# Patient Record
Sex: MALE | Race: WHITE | Employment: FULL TIME | URBAN - METROPOLITAN AREA
[De-identification: names, ages, dates, MRNs, and addresses within clinical notes are randomized per-mention and may not be internally consistent; named-entity substitution may affect disease eponyms.]

---

## 2017-08-01 ENCOUNTER — HOSPITAL ENCOUNTER (OUTPATIENT)
Dept: GENERAL RADIOLOGY | Age: 42
Discharge: HOME | End: 2017-08-01
Attending: PHYSICIAN ASSISTANT
Payer: COMMERCIAL

## 2017-08-01 ENCOUNTER — OFFICE VISIT (OUTPATIENT)
Dept: FAMILY MEDICINE CLINIC | Facility: CLINIC | Age: 42
End: 2017-08-01

## 2017-08-01 VITALS
SYSTOLIC BLOOD PRESSURE: 116 MMHG | BODY MASS INDEX: 27.57 KG/M2 | HEIGHT: 73 IN | OXYGEN SATURATION: 97 % | WEIGHT: 208 LBS | HEART RATE: 64 BPM | DIASTOLIC BLOOD PRESSURE: 84 MMHG | TEMPERATURE: 99 F | RESPIRATION RATE: 16 BRPM

## 2017-08-01 DIAGNOSIS — Z20.818 EXPOSURE TO STREP THROAT: ICD-10-CM

## 2017-08-01 DIAGNOSIS — S99.922A TOE INJURY, LEFT, INITIAL ENCOUNTER: ICD-10-CM

## 2017-08-01 DIAGNOSIS — S99.922A TOE INJURY, LEFT, INITIAL ENCOUNTER: Primary | ICD-10-CM

## 2017-08-01 LAB
CONTROL LINE PRESENT WITH A CLEAR BACKGROUND (YES/NO): YES YES/NO
STREP GRP A CUL-SCR: NEGATIVE

## 2017-08-01 PROCEDURE — 87880 STREP A ASSAY W/OPTIC: CPT | Performed by: PHYSICIAN ASSISTANT

## 2017-08-01 PROCEDURE — 73630 X-RAY EXAM OF FOOT: CPT | Performed by: PHYSICIAN ASSISTANT

## 2017-08-01 PROCEDURE — 99204 OFFICE O/P NEW MOD 45 MIN: CPT | Performed by: PHYSICIAN ASSISTANT

## 2017-08-01 RX ORDER — VIT A/VIT C/VIT E/ZINC/COPPER 7160-113
TABLET, DELAYED RELEASE (ENTERIC COATED) ORAL
COMMUNITY
End: 2021-04-19

## 2017-08-01 NOTE — PROGRESS NOTES
CHIEF COMPLAINT:   Patient presents with: Toe Injury: LT foot, second toe injury.      HPI:   Charlene Rajan is a 40year old male who presents for possible strep throat- was exposed- kids were diagnosed this week and last and left second toe injury/ Right arm, Patient Position: Sitting, Cuff Size: adult)   Pulse 64   Temp 98.8 °F (37.1 °C) (Oral)   Resp 16   Ht 73\"   Wt 208 lb   SpO2 97%   BMI 27.44 kg/m²   GENERAL: well developed, well nourished, and in no apparent distress  SKIN: no rashes, no susp stabilized- I advised this would most likely be a boot, pt states he would rather stabilize at home as wife is ER nurse. Patient advised to f/u with PCP or ortho for persistent symptoms or acute worsening/new symptoms.   In meantime, elevate, rest, and mot

## 2018-01-16 PROBLEM — Z12.11 COLON CANCER SCREENING: Status: ACTIVE | Noted: 2018-01-16

## 2018-01-16 PROCEDURE — 80074 ACUTE HEPATITIS PANEL: CPT | Performed by: INTERNAL MEDICINE

## 2018-12-08 PROCEDURE — 82390 ASSAY OF CERULOPLASMIN: CPT | Performed by: INTERNAL MEDICINE

## 2018-12-08 PROCEDURE — 80074 ACUTE HEPATITIS PANEL: CPT | Performed by: INTERNAL MEDICINE

## 2018-12-08 PROCEDURE — 83516 IMMUNOASSAY NONANTIBODY: CPT | Performed by: INTERNAL MEDICINE

## 2018-12-08 PROCEDURE — 82103 ALPHA-1-ANTITRYPSIN TOTAL: CPT | Performed by: INTERNAL MEDICINE

## 2018-12-19 PROBLEM — R03.0 PREHYPERTENSION: Status: ACTIVE | Noted: 2018-12-19

## 2019-02-15 PROBLEM — Z83.71 FAMILY HISTORY OF COLONIC POLYPS: Status: ACTIVE | Noted: 2019-02-15

## 2019-02-15 PROBLEM — Z80.0 FAMILY HISTORY OF MALIGNANT NEOPLASM OF DIGESTIVE ORGAN: Status: ACTIVE | Noted: 2019-02-15

## 2019-02-15 PROBLEM — R13.10 DYSPHAGIA: Status: ACTIVE | Noted: 2019-02-15

## 2019-02-15 PROBLEM — K21.9 GASTROESOPHAGEAL REFLUX DISEASE WITHOUT ESOPHAGITIS: Status: ACTIVE | Noted: 2019-02-15

## 2019-02-15 PROBLEM — K64.1 SECOND DEGREE HEMORRHOIDS: Status: ACTIVE | Noted: 2019-02-15

## 2019-02-15 PROBLEM — Z12.11 SPECIAL SCREENING FOR MALIGNANT NEOPLASM OF COLON: Status: ACTIVE | Noted: 2019-02-15

## 2019-02-15 PROBLEM — Z83.719 FAMILY HISTORY OF COLONIC POLYPS: Status: ACTIVE | Noted: 2019-02-15

## 2019-02-15 PROBLEM — K21.00 REFLUX ESOPHAGITIS: Status: ACTIVE | Noted: 2019-02-15

## 2019-11-20 PROCEDURE — 88305 TISSUE EXAM BY PATHOLOGIST: CPT | Performed by: ORTHOPAEDIC SURGERY

## 2019-11-25 PROBLEM — Z47.89 ORTHOPEDIC AFTERCARE: Status: ACTIVE | Noted: 2019-11-25

## 2021-04-19 ENCOUNTER — OFFICE VISIT (OUTPATIENT)
Dept: INTERNAL MEDICINE CLINIC | Facility: CLINIC | Age: 46
End: 2021-04-19
Payer: COMMERCIAL

## 2021-04-19 VITALS
HEART RATE: 72 BPM | RESPIRATION RATE: 16 BRPM | DIASTOLIC BLOOD PRESSURE: 72 MMHG | SYSTOLIC BLOOD PRESSURE: 124 MMHG | BODY MASS INDEX: 27.43 KG/M2 | HEIGHT: 73 IN | WEIGHT: 207 LBS | TEMPERATURE: 97 F | OXYGEN SATURATION: 98 %

## 2021-04-19 DIAGNOSIS — Z13.29 THYROID DISORDER SCREEN: ICD-10-CM

## 2021-04-19 DIAGNOSIS — Z13.89 SCREENING FOR GENITOURINARY CONDITION: ICD-10-CM

## 2021-04-19 DIAGNOSIS — Z00.00 LABORATORY EXAMINATION ORDERED AS PART OF A ROUTINE GENERAL MEDICAL EXAMINATION: ICD-10-CM

## 2021-04-19 DIAGNOSIS — Z00.00 ANNUAL PHYSICAL EXAM: Primary | ICD-10-CM

## 2021-04-19 DIAGNOSIS — Z13.0 SCREENING, IRON DEFICIENCY ANEMIA: ICD-10-CM

## 2021-04-19 DIAGNOSIS — K21.9 GASTROESOPHAGEAL REFLUX DISEASE: ICD-10-CM

## 2021-04-19 DIAGNOSIS — Z13.220 LIPID SCREENING: ICD-10-CM

## 2021-04-19 PROBLEM — R03.0 PREHYPERTENSION: Status: RESOLVED | Noted: 2018-12-19 | Resolved: 2021-04-19

## 2021-04-19 PROBLEM — Z47.89 ORTHOPEDIC AFTERCARE: Status: RESOLVED | Noted: 2019-11-25 | Resolved: 2021-04-19

## 2021-04-19 PROBLEM — R13.10 DYSPHAGIA: Status: RESOLVED | Noted: 2019-02-15 | Resolved: 2021-04-19

## 2021-04-19 PROBLEM — Z12.11 COLON CANCER SCREENING: Status: RESOLVED | Noted: 2018-01-16 | Resolved: 2021-04-19

## 2021-04-19 PROBLEM — K21.00 REFLUX ESOPHAGITIS: Status: RESOLVED | Noted: 2019-02-15 | Resolved: 2021-04-19

## 2021-04-19 PROBLEM — K64.1 SECOND DEGREE HEMORRHOIDS: Status: RESOLVED | Noted: 2019-02-15 | Resolved: 2021-04-19

## 2021-04-19 PROBLEM — Z12.11 SPECIAL SCREENING FOR MALIGNANT NEOPLASM OF COLON: Status: RESOLVED | Noted: 2019-02-15 | Resolved: 2021-04-19

## 2021-04-19 PROBLEM — Z83.71 FAMILY HISTORY OF COLONIC POLYPS: Status: RESOLVED | Noted: 2019-02-15 | Resolved: 2021-04-19

## 2021-04-19 PROBLEM — Z83.719 FAMILY HISTORY OF COLONIC POLYPS: Status: RESOLVED | Noted: 2019-02-15 | Resolved: 2021-04-19

## 2021-04-19 PROCEDURE — 99386 PREV VISIT NEW AGE 40-64: CPT | Performed by: INTERNAL MEDICINE

## 2021-04-19 PROCEDURE — 3008F BODY MASS INDEX DOCD: CPT | Performed by: INTERNAL MEDICINE

## 2021-04-19 PROCEDURE — 3074F SYST BP LT 130 MM HG: CPT | Performed by: INTERNAL MEDICINE

## 2021-04-19 PROCEDURE — 3078F DIAST BP <80 MM HG: CPT | Performed by: INTERNAL MEDICINE

## 2021-04-19 RX ORDER — OMEPRAZOLE 40 MG/1
40 CAPSULE, DELAYED RELEASE ORAL DAILY
Qty: 90 CAPSULE | Refills: 3 | Status: SHIPPED | OUTPATIENT
Start: 2021-04-19 | End: 2021-12-14

## 2021-04-19 NOTE — PROGRESS NOTES
HPI/Subjective:   Patient ID: Yoon Gotti is a 40year old male.     HPI  Yoon Gotti is a 40year old male who presents for a complete physical exam.   HPI:   Pt complains of nothing  gerd sxs controlled, PPI controlled    PAST MEDICAL, S Wears glasses       Past Surgical History:   Procedure Laterality Date   • COLONOSCOPY     • EGD     • TONSILLECTOMY        Family History   Problem Relation Age of Onset   • Colon Polyps Father    • Lipids Father    • Colon Polyps Paternal Uncle       Soc clubbing or edema  NEURO: Oriented times three,motor and sensory are grossly intact    ASSESSMENT AND PLAN:   Bret Copeland is a 40year old male who presents for a complete physical exam.  Body mass index is 27.31 kg/m². , recommended low fat diet a

## 2021-09-10 LAB
AMB EXT CHOL/HDL RATIO: 4.4
AMB EXT CHOLESTEROL, TOTAL: 283 MG/DL
AMB EXT CMP ALT: 67 U/L
AMB EXT CMP AST: 45 U/L
AMB EXT GLUCOSE: 106 MG/DL
AMB EXT HDL CHOLESTEROL: 65 MG/DL
AMB EXT LDL CHOLESTEROL, DIRECT: 180 MG/DL
AMB EXT TRIGLYCERIDES: 189 MG/DL
AMB EXT VLDL: 38 MG/DL

## 2021-09-13 ENCOUNTER — MED REC SCAN ONLY (OUTPATIENT)
Dept: INTERNAL MEDICINE CLINIC | Facility: CLINIC | Age: 46
End: 2021-09-13

## 2021-09-14 ENCOUNTER — PATIENT MESSAGE (OUTPATIENT)
Dept: INTERNAL MEDICINE CLINIC | Facility: CLINIC | Age: 46
End: 2021-09-14

## 2021-09-17 ENCOUNTER — TELEPHONE (OUTPATIENT)
Dept: INTERNAL MEDICINE CLINIC | Facility: CLINIC | Age: 46
End: 2021-09-17

## 2021-09-17 DIAGNOSIS — R79.89 ELEVATED LFTS: ICD-10-CM

## 2021-09-17 DIAGNOSIS — E78.2 MODERATE MIXED HYPERLIPIDEMIA NOT REQUIRING STATIN THERAPY: Primary | ICD-10-CM

## 2021-09-17 PROBLEM — Z91.89 10 YEAR RISK OF MI OR STROKE < 7.5%: Status: ACTIVE | Noted: 2021-09-17

## 2021-09-17 NOTE — TELEPHONE ENCOUNTER
Dr. Joanna Pelayo reviewed labs from [address] [place] [address]. See scanned results. Per Dr. Joanna Pelayo begin Low fat/cholesterol for elevated lipids, recheck lipid and LFT in 6 months. D/w pt above recommendations. He expressed understanding.   Orders placed

## 2021-11-10 ENCOUNTER — MED REC SCAN ONLY (OUTPATIENT)
Dept: INTERNAL MEDICINE CLINIC | Facility: CLINIC | Age: 46
End: 2021-11-10

## 2021-12-14 ENCOUNTER — OFFICE VISIT (OUTPATIENT)
Dept: INTERNAL MEDICINE CLINIC | Facility: CLINIC | Age: 46
End: 2021-12-14
Payer: COMMERCIAL

## 2021-12-14 VITALS
SYSTOLIC BLOOD PRESSURE: 124 MMHG | HEART RATE: 85 BPM | OXYGEN SATURATION: 99 % | DIASTOLIC BLOOD PRESSURE: 70 MMHG | WEIGHT: 221 LBS | RESPIRATION RATE: 16 BRPM | BODY MASS INDEX: 29.29 KG/M2 | TEMPERATURE: 98 F | HEIGHT: 73 IN

## 2021-12-14 DIAGNOSIS — F51.04 PSYCHOPHYSIOLOGIC INSOMNIA: ICD-10-CM

## 2021-12-14 DIAGNOSIS — S29.012A STRAIN OF RHOMBOID MUSCLE, INITIAL ENCOUNTER: Primary | ICD-10-CM

## 2021-12-14 DIAGNOSIS — K21.9 GASTROESOPHAGEAL REFLUX DISEASE: ICD-10-CM

## 2021-12-14 PROCEDURE — 3074F SYST BP LT 130 MM HG: CPT | Performed by: INTERNAL MEDICINE

## 2021-12-14 PROCEDURE — 3008F BODY MASS INDEX DOCD: CPT | Performed by: INTERNAL MEDICINE

## 2021-12-14 PROCEDURE — 3078F DIAST BP <80 MM HG: CPT | Performed by: INTERNAL MEDICINE

## 2021-12-14 PROCEDURE — 99213 OFFICE O/P EST LOW 20 MIN: CPT | Performed by: INTERNAL MEDICINE

## 2021-12-14 RX ORDER — HYDROCORTISONE 25 MG/G
1 CREAM TOPICAL 2 TIMES DAILY
Qty: 30 G | Refills: 2 | Status: SHIPPED | OUTPATIENT
Start: 2021-12-14 | End: 2021-12-14

## 2021-12-14 RX ORDER — TIZANIDINE HYDROCHLORIDE 4 MG/1
4 CAPSULE, GELATIN COATED ORAL NIGHTLY
Qty: 5 CAPSULE | Refills: 0 | Status: SHIPPED | OUTPATIENT
Start: 2021-12-14

## 2021-12-14 RX ORDER — TRAZODONE HYDROCHLORIDE 50 MG/1
50 TABLET ORAL NIGHTLY PRN
Qty: 90 TABLET | Refills: 1 | Status: SHIPPED | OUTPATIENT
Start: 2021-12-14

## 2021-12-14 RX ORDER — OMEPRAZOLE 40 MG/1
40 CAPSULE, DELAYED RELEASE ORAL DAILY
Qty: 90 CAPSULE | Refills: 3 | Status: SHIPPED | OUTPATIENT
Start: 2021-12-14

## 2021-12-15 NOTE — PROGRESS NOTES
Subjective:   Patient ID: Nannette Urbina is a 40year old male. Neck Pain   This is a new problem. The current episode started more than 1 month ago. The problem occurs constantly. The problem has been waxing and waning.  The pain is associated wit Alert and oriented x 3. HEENT: Normocephalic atraumatic. Moist mucous membranes. EOM-I. PERRLA. Anicteric. Neck: No lymphadenopathy. tender right sternocleido muscles  Respiratory: Clear to auscultation bilaterally. No wheezes. No rhonchi.   Davey Fabry

## 2023-03-17 DIAGNOSIS — K21.9 GASTROESOPHAGEAL REFLUX DISEASE: ICD-10-CM

## 2023-03-17 RX ORDER — OMEPRAZOLE 40 MG/1
CAPSULE, DELAYED RELEASE ORAL
Qty: 90 CAPSULE | Refills: 3 | Status: SHIPPED | OUTPATIENT
Start: 2023-03-17

## 2023-03-17 NOTE — TELEPHONE ENCOUNTER
Last time medication was refilled 12/14/21  Quantity and # of refills: 90 caps w/3 refills  Last OV 12/14/21  Next OV No future appointments.

## 2023-07-25 ENCOUNTER — APPOINTMENT (OUTPATIENT)
Dept: GENERAL RADIOLOGY | Age: 48
End: 2023-07-25
Attending: EMERGENCY MEDICINE
Payer: COMMERCIAL

## 2023-07-25 ENCOUNTER — HOSPITAL ENCOUNTER (EMERGENCY)
Age: 48
Discharge: HOME | End: 2023-07-26
Attending: EMERGENCY MEDICINE
Payer: COMMERCIAL

## 2023-07-25 DIAGNOSIS — K21.00 GASTROESOPHAGEAL REFLUX DISEASE WITH ESOPHAGITIS WITHOUT HEMORRHAGE: Primary | ICD-10-CM

## 2023-07-25 LAB
ALBUMIN SERPL-MCNC: 3.8 G/DL (ref 3.4–5)
ALBUMIN/GLOB SERPL: 1.2 {RATIO} (ref 1–2)
ALP LIVER SERPL-CCNC: 94 U/L
ALT SERPL-CCNC: 99 U/L
ANION GAP SERPL CALC-SCNC: 4 MMOL/L (ref 0–18)
AST SERPL-CCNC: 48 U/L (ref 15–37)
BASOPHILS # BLD AUTO: 0.05 X10(3) UL (ref 0–0.2)
BASOPHILS NFR BLD AUTO: 0.6 %
BILIRUB SERPL-MCNC: 0.7 MG/DL (ref 0.1–2)
BUN BLD-MCNC: 12 MG/DL (ref 7–18)
CALCIUM BLD-MCNC: 8.4 MG/DL (ref 8.5–10.1)
CHLORIDE SERPL-SCNC: 107 MMOL/L (ref 98–112)
CO2 SERPL-SCNC: 25 MMOL/L (ref 21–32)
CREAT BLD-MCNC: 1.18 MG/DL
EGFRCR SERPLBLD CKD-EPI 2021: 77 ML/MIN/1.73M2 (ref 60–?)
EOSINOPHIL # BLD AUTO: 0.46 X10(3) UL (ref 0–0.7)
EOSINOPHIL NFR BLD AUTO: 5.9 %
ERYTHROCYTE [DISTWIDTH] IN BLOOD BY AUTOMATED COUNT: 12.5 %
GLOBULIN PLAS-MCNC: 3.1 G/DL (ref 2.8–4.4)
GLUCOSE BLD-MCNC: 97 MG/DL (ref 70–99)
HCT VFR BLD AUTO: 47.1 %
HGB BLD-MCNC: 16.4 G/DL
IMM GRANULOCYTES # BLD AUTO: 0.01 X10(3) UL (ref 0–1)
IMM GRANULOCYTES NFR BLD: 0.1 %
LYMPHOCYTES # BLD AUTO: 2.05 X10(3) UL (ref 1–4)
LYMPHOCYTES NFR BLD AUTO: 26.1 %
MCH RBC QN AUTO: 30 PG (ref 26–34)
MCHC RBC AUTO-ENTMCNC: 34.8 G/DL (ref 31–37)
MCV RBC AUTO: 86.3 FL
MONOCYTES # BLD AUTO: 0.7 X10(3) UL (ref 0.1–1)
MONOCYTES NFR BLD AUTO: 8.9 %
NEUTROPHILS # BLD AUTO: 4.58 X10 (3) UL (ref 1.5–7.7)
NEUTROPHILS # BLD AUTO: 4.58 X10(3) UL (ref 1.5–7.7)
NEUTROPHILS NFR BLD AUTO: 58.4 %
OSMOLALITY SERPL CALC.SUM OF ELEC: 282 MOSM/KG (ref 275–295)
PLATELET # BLD AUTO: 203 10(3)UL (ref 150–450)
POTASSIUM SERPL-SCNC: 3.9 MMOL/L (ref 3.5–5.1)
PROT SERPL-MCNC: 6.9 G/DL (ref 6.4–8.2)
RBC # BLD AUTO: 5.46 X10(6)UL
SODIUM SERPL-SCNC: 136 MMOL/L (ref 136–145)
TROPONIN I HIGH SENSITIVITY: 3 NG/L
WBC # BLD AUTO: 7.9 X10(3) UL (ref 4–11)

## 2023-07-25 PROCEDURE — 96374 THER/PROPH/DIAG INJ IV PUSH: CPT

## 2023-07-25 PROCEDURE — 84484 ASSAY OF TROPONIN QUANT: CPT

## 2023-07-25 PROCEDURE — 99284 EMERGENCY DEPT VISIT MOD MDM: CPT

## 2023-07-25 PROCEDURE — 93010 ELECTROCARDIOGRAM REPORT: CPT

## 2023-07-25 PROCEDURE — 93005 ELECTROCARDIOGRAM TRACING: CPT

## 2023-07-25 PROCEDURE — 80053 COMPREHEN METABOLIC PANEL: CPT

## 2023-07-25 PROCEDURE — 85025 COMPLETE CBC W/AUTO DIFF WBC: CPT | Performed by: EMERGENCY MEDICINE

## 2023-07-25 PROCEDURE — 84484 ASSAY OF TROPONIN QUANT: CPT | Performed by: EMERGENCY MEDICINE

## 2023-07-25 PROCEDURE — 80053 COMPREHEN METABOLIC PANEL: CPT | Performed by: EMERGENCY MEDICINE

## 2023-07-25 PROCEDURE — 71045 X-RAY EXAM CHEST 1 VIEW: CPT | Performed by: EMERGENCY MEDICINE

## 2023-07-25 PROCEDURE — S0028 INJECTION, FAMOTIDINE, 20 MG: HCPCS | Performed by: EMERGENCY MEDICINE

## 2023-07-25 PROCEDURE — 99285 EMERGENCY DEPT VISIT HI MDM: CPT

## 2023-07-25 PROCEDURE — 85025 COMPLETE CBC W/AUTO DIFF WBC: CPT

## 2023-07-25 RX ORDER — MAGNESIUM HYDROXIDE/ALUMINUM HYDROXICE/SIMETHICONE 120; 1200; 1200 MG/30ML; MG/30ML; MG/30ML
30 SUSPENSION ORAL ONCE
Status: COMPLETED | OUTPATIENT
Start: 2023-07-25 | End: 2023-07-25

## 2023-07-25 RX ORDER — FAMOTIDINE 10 MG/ML
20 INJECTION, SOLUTION INTRAVENOUS ONCE
Status: COMPLETED | OUTPATIENT
Start: 2023-07-25 | End: 2023-07-25

## 2023-07-25 RX ORDER — ASPIRIN 81 MG/1
324 TABLET, CHEWABLE ORAL ONCE
Status: COMPLETED | OUTPATIENT
Start: 2023-07-25 | End: 2023-07-25

## 2023-07-26 VITALS
TEMPERATURE: 98 F | HEART RATE: 65 BPM | HEIGHT: 73 IN | OXYGEN SATURATION: 96 % | DIASTOLIC BLOOD PRESSURE: 88 MMHG | RESPIRATION RATE: 16 BRPM | BODY MASS INDEX: 27.83 KG/M2 | SYSTOLIC BLOOD PRESSURE: 122 MMHG | WEIGHT: 210 LBS

## 2023-07-26 LAB
ATRIAL RATE: 67 BPM
P AXIS: 30 DEGREES
P-R INTERVAL: 154 MS
Q-T INTERVAL: 404 MS
QRS DURATION: 76 MS
QTC CALCULATION (BEZET): 426 MS
R AXIS: 19 DEGREES
T AXIS: 33 DEGREES
VENTRICULAR RATE: 67 BPM

## 2023-07-26 NOTE — ED INITIAL ASSESSMENT (HPI)
Pt to ed with c/o intermittent chest pain that started 40 min ago while he was eating dinner. States pain radiates to his L chest and feels like a \"twisting\" sensation. States he threw up once when the pain first came. Denies shortness of breath.

## 2024-06-25 ENCOUNTER — OFFICE VISIT (OUTPATIENT)
Dept: INTERNAL MEDICINE CLINIC | Facility: CLINIC | Age: 49
End: 2024-06-25

## 2024-06-25 VITALS
OXYGEN SATURATION: 98 % | BODY MASS INDEX: 28.76 KG/M2 | WEIGHT: 217 LBS | RESPIRATION RATE: 16 BRPM | TEMPERATURE: 97 F | DIASTOLIC BLOOD PRESSURE: 82 MMHG | HEART RATE: 78 BPM | SYSTOLIC BLOOD PRESSURE: 124 MMHG | HEIGHT: 73 IN

## 2024-06-25 DIAGNOSIS — K21.9 GASTROESOPHAGEAL REFLUX DISEASE: ICD-10-CM

## 2024-06-25 DIAGNOSIS — Z00.00 ROUTINE GENERAL MEDICAL EXAMINATION AT A HEALTH CARE FACILITY: ICD-10-CM

## 2024-06-25 DIAGNOSIS — N52.9 ERECTILE DYSFUNCTION, UNSPECIFIED ERECTILE DYSFUNCTION TYPE: ICD-10-CM

## 2024-06-25 DIAGNOSIS — Z00.00 ANNUAL PHYSICAL EXAM: Primary | ICD-10-CM

## 2024-06-25 DIAGNOSIS — L98.9 SKIN LESION: ICD-10-CM

## 2024-06-25 PROCEDURE — 3008F BODY MASS INDEX DOCD: CPT | Performed by: INTERNAL MEDICINE

## 2024-06-25 PROCEDURE — 99396 PREV VISIT EST AGE 40-64: CPT | Performed by: INTERNAL MEDICINE

## 2024-06-25 PROCEDURE — 3074F SYST BP LT 130 MM HG: CPT | Performed by: INTERNAL MEDICINE

## 2024-06-25 PROCEDURE — 3079F DIAST BP 80-89 MM HG: CPT | Performed by: INTERNAL MEDICINE

## 2024-06-25 RX ORDER — OMEPRAZOLE 40 MG/1
40 CAPSULE, DELAYED RELEASE ORAL DAILY
Qty: 90 CAPSULE | Refills: 3 | Status: SHIPPED | OUTPATIENT
Start: 2024-06-25

## 2024-06-25 RX ORDER — SILDENAFIL 100 MG/1
TABLET, FILM COATED ORAL
Qty: 24 TABLET | Refills: 3 | Status: SHIPPED | OUTPATIENT
Start: 2024-06-25

## 2024-06-25 NOTE — PROGRESS NOTES
Subjective:   Patient ID: Jeramy Tarango is a 49 year old male.    HPI  Jeramy Tarango is a 49 year old male who presents for a complete physical exam.   HPI:   Pt complains of nothing  Normal state of health  Gerd stable  Would like vaigra for ED    PAST MEDICAL, SOCIAL, FAMILY HISTORIES REVIEWED WITH PT  .    Immunization History   Administered Date(s) Administered    Covid-19 Vaccine Pfizer 30 mcg/0.3 ml 03/04/2021, 03/25/2021, 12/08/2021    Influenza 10/03/2018, 11/10/2021    TDAP 04/19/2012, 03/17/2022     Wt Readings from Last 6 Encounters:   06/25/24 217 lb (98.4 kg)   07/25/23 210 lb (95.3 kg)   12/14/21 221 lb (100.2 kg)   04/19/21 207 lb (93.9 kg)   11/20/19 191 lb (86.6 kg)   11/07/19 195 lb (88.5 kg)     Body mass index is 28.63 kg/m².     Lab Results   Component Value Date    GLU 97 07/25/2023     09/10/2021    GLU 93 01/15/2018    GLUCOSE 89 09/27/2016    GLUCOSE 85 09/26/2015    GLUCOSE 86 04/07/2014     Lab Results   Component Value Date    CHOLEST 283 09/10/2021    CHOLEST 192 12/08/2018    CHOLEST 251 (H) 01/15/2018     Lab Results   Component Value Date    HDL 65 09/10/2021    HDL 53 12/08/2018    HDL 61 01/15/2018     Lab Results   Component Value Date     12/08/2018     (H) 01/15/2018     (H) 09/27/2016     Lab Results   Component Value Date    AST 48 (H) 07/25/2023    AST 45 09/10/2021    AST 31 12/08/2018     Lab Results   Component Value Date    ALT 99 (H) 07/25/2023    ALT 67 09/10/2021    ALT 63 12/08/2018     Lab Results   Component Value Date    PSA 0.41 01/15/2018    PSA 0.4 09/27/2016        Current Outpatient Medications   Medication Sig Dispense Refill    Omeprazole 40 MG Oral Capsule Delayed Release Take 1 capsule (40 mg total) by mouth daily. 90 capsule 3    Sildenafil Citrate (VIAGRA) 100 MG Oral Tab Take one tablet by mouth half hour prior to intercourse (max dose 1 tablet /24 hours) 24 tablet 3    cetirizine (ZYRTEC ALLERGY) 10 MG Oral Tab  Take 1 tablet (10 mg total) by mouth daily.        Past Medical History:    Abdominal distention    Bloating    Heartburn    Chronic - take medication    Hyperlipidemia    Indigestion    Painful swallowing    R/t eosinophilic esophagitis    Wears glasses      Past Surgical History:   Procedure Laterality Date    Colonoscopy      Egd      Tonsillectomy        Family History   Problem Relation Age of Onset    Colon Polyps Father     Lipids Father     Colon Cancer Father     Diabetes Father     Colon Polyps Paternal Uncle       Social History:  Social History     Socioeconomic History    Marital status:    Tobacco Use    Smoking status: Former    Smokeless tobacco: Never   Vaping Use    Vaping status: Never Used   Substance and Sexual Activity    Alcohol use: Yes     Alcohol/week: 14.0 standard drinks of alcohol     Types: 2 Glasses of wine, 10 Cans of beer, 2 Standard drinks or equivalent per week     Comment: occasional    Drug use: Never      Occ: yes. : yes. Children: yes.   Exercise:  twice per week, three times per week.  Diet: watches fats closely and watches sugar closely     REVIEW OF SYSTEMS:   A comprehensive 10 point review of systems was completed.     Pertinent positives and negatives noted in the HPI.      EXAM:   /82   Pulse 78   Temp 97 °F (36.1 °C) (Temporal)   Resp 16   Ht 6' 1\" (1.854 m)   Wt 217 lb (98.4 kg)   SpO2 98%   BMI 28.63 kg/m²   Body mass index is 28.63 kg/m².   GENERAL: well developed, well nourished,in no apparent distress  SKIN: no rashes,no suspicious lesions  HEENT: atraumatic, normocephalic,ears and throat are clear  EYES:PERRLA, EOMI, conjunctiva are clear  NECK: supple,no adenopathy,no bruits  LUNGS: clear to auscultation  CARDIO: RRR without murmur  GI: good BS's,no masses, HSM or tenderness  : deferred  MUSCULOSKELETAL: back is not tender  EXTREMITIES: no cyanosis, clubbing or edema  NEURO: Oriented times three,motor and sensory are grossly  intact    ASSESSMENT AND PLAN:   Jeramy Tarango is a 49 year old male who presents for a complete physical exam.  Body mass index is 28.63 kg/m²., recommended low fat diet and aerobic exercise 30 minutes three times weekly. Health maintenance, will check fasting Lipids, CMP, CBC   UTD with  screening colonoscopy.   Pt info handouts given for: exercise, low fat diet,   The patient indicates understanding of these issues and agrees to the plan.  The patient is asked to return for CPX in 12 m.    History/Other:   Review of Systems  Current Outpatient Medications   Medication Sig Dispense Refill    Omeprazole 40 MG Oral Capsule Delayed Release Take 1 capsule (40 mg total) by mouth daily. 90 capsule 3    Sildenafil Citrate (VIAGRA) 100 MG Oral Tab Take one tablet by mouth half hour prior to intercourse (max dose 1 tablet /24 hours) 24 tablet 3    cetirizine (ZYRTEC ALLERGY) 10 MG Oral Tab Take 1 tablet (10 mg total) by mouth daily.       Allergies:No Known Allergies    Objective:   Physical Exam    Assessment & Plan:   1. Annual physical exam    2. Routine general medical examination at a health care facility    3. Skin lesion    4. Gastroesophageal reflux disease    5. Erectile dysfunction, unspecified erectile dysfunction type        Orders Placed This Encounter   Procedures    CBC With Differential With Platelet    Comp Metabolic Panel (14)    Lipid Panel    TSH W Reflex To Free T4    Urinalysis, Routine       Meds This Visit:  Requested Prescriptions     Signed Prescriptions Disp Refills    Omeprazole 40 MG Oral Capsule Delayed Release 90 capsule 3     Sig: Take 1 capsule (40 mg total) by mouth daily.    Sildenafil Citrate (VIAGRA) 100 MG Oral Tab 24 tablet 3     Sig: Take one tablet by mouth half hour prior to intercourse (max dose 1 tablet /24 hours)       Imaging & Referrals:  [facility] - INTERNAL

## 2024-06-28 PROBLEM — Z80.0 FAMILY HISTORY OF COLON CANCER: Status: ACTIVE | Noted: 2024-06-28

## 2024-06-28 PROBLEM — Z83.719 FAMILY HISTORY OF COLON POLYPS, UNSPECIFIED: Status: ACTIVE | Noted: 2024-06-28

## 2024-06-28 PROBLEM — Z12.11 SPECIAL SCREENING FOR MALIGNANT NEOPLASMS, COLON: Status: ACTIVE | Noted: 2024-06-28

## 2025-06-24 ENCOUNTER — LAB ENCOUNTER (OUTPATIENT)
Dept: LAB | Age: 50
End: 2025-06-24
Attending: INTERNAL MEDICINE
Payer: COMMERCIAL

## 2025-06-24 DIAGNOSIS — Z00.00 ROUTINE GENERAL MEDICAL EXAMINATION AT A HEALTH CARE FACILITY: ICD-10-CM

## 2025-06-24 PROBLEM — Z12.11 SPECIAL SCREENING FOR MALIGNANT NEOPLASMS, COLON: Status: RESOLVED | Noted: 2024-06-28 | Resolved: 2025-06-24

## 2025-06-24 LAB
ALBUMIN SERPL-MCNC: 4.8 G/DL (ref 3.2–4.8)
ALBUMIN/GLOB SERPL: 1.8 {RATIO} (ref 1–2)
ALP LIVER SERPL-CCNC: 91 U/L (ref 45–117)
ALT SERPL-CCNC: 87 U/L (ref 10–49)
ANION GAP SERPL CALC-SCNC: 8 MMOL/L (ref 0–18)
AST SERPL-CCNC: 45 U/L (ref ?–34)
BASOPHILS # BLD AUTO: 0.05 X10(3) UL (ref 0–0.2)
BASOPHILS NFR BLD AUTO: 0.8 %
BILIRUB SERPL-MCNC: 0.9 MG/DL (ref 0.3–1.2)
BILIRUB UR QL STRIP.AUTO: NEGATIVE
BUN BLD-MCNC: 15 MG/DL (ref 9–23)
CALCIUM BLD-MCNC: 9.8 MG/DL (ref 8.7–10.6)
CHLORIDE SERPL-SCNC: 105 MMOL/L (ref 98–112)
CHOLEST SERPL-MCNC: 299 MG/DL (ref ?–200)
CLARITY UR REFRACT.AUTO: CLEAR
CO2 SERPL-SCNC: 25 MMOL/L (ref 21–32)
CREAT BLD-MCNC: 1.16 MG/DL (ref 0.7–1.3)
EGFRCR SERPLBLD CKD-EPI 2021: 77 ML/MIN/1.73M2 (ref 60–?)
EOSINOPHIL # BLD AUTO: 0.36 X10(3) UL (ref 0–0.7)
EOSINOPHIL NFR BLD AUTO: 5.6 %
ERYTHROCYTE [DISTWIDTH] IN BLOOD BY AUTOMATED COUNT: 12.8 %
FASTING PATIENT LIPID ANSWER: YES
FASTING STATUS PATIENT QL REPORTED: YES
GLOBULIN PLAS-MCNC: 2.6 G/DL (ref 2–3.5)
GLUCOSE BLD-MCNC: 85 MG/DL (ref 70–99)
GLUCOSE UR STRIP.AUTO-MCNC: NORMAL MG/DL
HCT VFR BLD AUTO: 49.4 % (ref 39–53)
HDLC SERPL-MCNC: 52 MG/DL (ref 40–59)
HGB BLD-MCNC: 16.3 G/DL (ref 13–17.5)
IMM GRANULOCYTES # BLD AUTO: 0.01 X10(3) UL (ref 0–1)
IMM GRANULOCYTES NFR BLD: 0.2 %
KETONES UR STRIP.AUTO-MCNC: NEGATIVE MG/DL
LDLC SERPL CALC-MCNC: 209 MG/DL (ref ?–100)
LEUKOCYTE ESTERASE UR QL STRIP.AUTO: NEGATIVE
LYMPHOCYTES # BLD AUTO: 1.94 X10(3) UL (ref 1–4)
LYMPHOCYTES NFR BLD AUTO: 30 %
MCH RBC QN AUTO: 29.6 PG (ref 26–34)
MCHC RBC AUTO-ENTMCNC: 33 G/DL (ref 31–37)
MCV RBC AUTO: 89.7 FL (ref 80–100)
MONOCYTES # BLD AUTO: 0.65 X10(3) UL (ref 0.1–1)
MONOCYTES NFR BLD AUTO: 10.1 %
NEUTROPHILS # BLD AUTO: 3.45 X10 (3) UL (ref 1.5–7.7)
NEUTROPHILS # BLD AUTO: 3.45 X10(3) UL (ref 1.5–7.7)
NEUTROPHILS NFR BLD AUTO: 53.3 %
NITRITE UR QL STRIP.AUTO: NEGATIVE
NONHDLC SERPL-MCNC: 247 MG/DL (ref ?–130)
OSMOLALITY SERPL CALC.SUM OF ELEC: 286 MOSM/KG (ref 275–295)
PH UR STRIP.AUTO: 5.5 [PH] (ref 5–8)
PLATELET # BLD AUTO: 190 10(3)UL (ref 150–450)
POTASSIUM SERPL-SCNC: 4.1 MMOL/L (ref 3.5–5.1)
PROT SERPL-MCNC: 7.4 G/DL (ref 5.7–8.2)
PROT UR STRIP.AUTO-MCNC: NEGATIVE MG/DL
RBC # BLD AUTO: 5.51 X10(6)UL (ref 4.3–5.7)
RBC UR QL AUTO: NEGATIVE
SODIUM SERPL-SCNC: 138 MMOL/L (ref 136–145)
SP GR UR STRIP.AUTO: 1.01 (ref 1–1.03)
TRIGL SERPL-MCNC: 199 MG/DL (ref 30–149)
TSI SER-ACNC: 2.42 UIU/ML (ref 0.55–4.78)
UROBILINOGEN UR STRIP.AUTO-MCNC: NORMAL MG/DL
VLDLC SERPL CALC-MCNC: 44 MG/DL (ref 0–30)
WBC # BLD AUTO: 6.5 X10(3) UL (ref 4–11)

## 2025-06-24 PROCEDURE — 80061 LIPID PANEL: CPT | Performed by: INTERNAL MEDICINE

## 2025-06-24 PROCEDURE — 81003 URINALYSIS AUTO W/O SCOPE: CPT | Performed by: INTERNAL MEDICINE

## 2025-06-24 PROCEDURE — 80050 GENERAL HEALTH PANEL: CPT | Performed by: INTERNAL MEDICINE

## 2025-06-24 NOTE — PROGRESS NOTES
Subjective:   Patient ID: Jeramy Tarango is a 50 year old male.    HPI  Jeramy Tarango is a 50 year old male who presents for a complete physical exam.   HPI:   Pt reports normal state of health  Denies cp or sob  No issues reported    PAST MEDICAL, SOCIAL, FAMILY HISTORIES REVIEWED WITH PT  .    Immunization History   Administered Date(s) Administered    Covid-19 Vaccine Pfizer 30 mcg/0.3 ml 03/04/2021, 03/25/2021, 12/08/2021    Influenza 10/03/2018, 11/10/2021    TDAP 04/19/2012, 03/17/2022     Wt Readings from Last 6 Encounters:   06/26/25 222 lb (100.7 kg)   06/25/24 217 lb (98.4 kg)   07/25/23 210 lb (95.3 kg)   12/14/21 221 lb (100.2 kg)   04/19/21 207 lb (93.9 kg)   11/20/19 191 lb (86.6 kg)     Body mass index is 29.29 kg/m².     Lab Results   Component Value Date    GLU 85 06/24/2025    GLU 97 07/25/2023     09/10/2021    GLUCOSE 89 09/27/2016    GLUCOSE 85 09/26/2015    GLUCOSE 86 04/07/2014     Lab Results   Component Value Date    CHOLEST 299 (H) 06/24/2025    CHOLEST 283 09/10/2021    CHOLEST 192 12/08/2018     Lab Results   Component Value Date    HDL 52 06/24/2025    HDL 65 09/10/2021    HDL 53 12/08/2018     Lab Results   Component Value Date     (H) 06/24/2025     12/08/2018     (H) 01/15/2018     Lab Results   Component Value Date    AST 45 (H) 06/24/2025    AST 48 (H) 07/25/2023    AST 45 09/10/2021     Lab Results   Component Value Date    ALT 87 (H) 06/24/2025    ALT 99 (H) 07/25/2023    ALT 67 09/10/2021     Lab Results   Component Value Date    PSA 0.41 01/15/2018    PSA 0.4 09/27/2016        Current Medications[1]   Past Medical History[2]   Past Surgical History[3]   Family History[4]   Social History:  Short Social Hx on File[5]   Occ: yes. : yes. Children: yes.   Exercise: once per week,  twice per week.  Diet: watches fats closely and watches sugar closely     REVIEW OF SYSTEMS:   GENERAL: feels well otherwise  A comprehensive 10 point review  of systems was completed.     Pertinent positives and negatives noted in the HPI.      EXAM:   /84   Pulse 92   Temp 97.4 °F (36.3 °C) (Temporal)   Resp 16   Ht 6' 1\" (1.854 m)   Wt 222 lb (100.7 kg)   SpO2 97%   BMI 29.29 kg/m²   Body mass index is 29.29 kg/m².   GENERAL: well developed, well nourished,in no apparent distress  SKIN: no rashes,no suspicious lesions  HEENT: atraumatic, normocephalic,ears and throat are clear  EYES:PERRLA, EOMI, conjunctiva are clear  NECK: supple,no adenopathy,no bruits  LUNGS: clear to auscultation  CARDIO: RRR without murmur  GI: good BS's,no masses, HSM or tenderness  : deferred  MUSCULOSKELETAL: back is not tender  EXTREMITIES: no cyanosis, clubbing or edema  NEURO: Oriented times three,motor and sensory are grossly intact    ASSESSMENT AND PLAN:   Jeramy Tarango is a 50 year old male who presents for a complete physical exam.  Body mass index is 29.29 kg/m²., recommended low fat diet and aerobic exercise 30 minutes three times weekly.     Health maintenance, we reviewed his fasting Lipids, CMP, CBC results    Start crestor 10 mg daily to lower CAD risk    Utd with screening colonoscopy.     info handouts given for: exercise, low fat diet,    The patient indicates understanding of these issues and agrees to the plan.  The patient is asked to return for CPX in 12 m.    History/Other:   Review of Systems  Current Medications[6]  Allergies:Allergies[7]    Objective:   Physical Exam    Assessment & Plan:   1. Annual physical exam    2. Hypercholesterolemia with LDL greater than 190 mg/dL        Orders Placed This Encounter   Procedures    Comp Metabolic Panel (14)    Lipid Panel       Meds This Visit:  Requested Prescriptions     Signed Prescriptions Disp Refills    rosuvastatin (CRESTOR) 10 MG Oral Tab 90 tablet 3     Sig: Take 1 tablet (10 mg total) by mouth daily.       Imaging & Referrals:  None         [1]   Current Outpatient Medications   Medication Sig  Dispense Refill    rosuvastatin (CRESTOR) 10 MG Oral Tab Take 1 tablet (10 mg total) by mouth daily. 90 tablet 3    Omeprazole 40 MG Oral Capsule Delayed Release Take 1 capsule (40 mg total) by mouth daily. 90 capsule 3    Sildenafil Citrate (VIAGRA) 100 MG Oral Tab Take one tablet by mouth half hour prior to intercourse (max dose 1 tablet /24 hours) 24 tablet 3    cetirizine (ZYRTEC ALLERGY) 10 MG Oral Tab Take 1 tablet (10 mg total) by mouth daily.     [2]   Past Medical History:   Abdominal distention    Allergic rhinitis    Anxiety    Bloating    Esophageal reflux    Heartburn    Chronic - take medication    Hyperlipidemia    Indigestion    Painful swallowing    R/t eosinophilic esophagitis    Wears glasses   [3]   Past Surgical History:  Procedure Laterality Date    Colonoscopy      Egd      Other surgical history      Tonsillectomy     [4]   Family History  Problem Relation Age of Onset    Colon Polyps Father     Lipids Father     Colon Cancer Father     Diabetes Father     Colon Polyps Paternal Uncle     Cancer Maternal Grandfather     Dementia Maternal Grandmother     Asthma Sister     Asthma Sister    [5]   Social History  Socioeconomic History    Marital status:    Tobacco Use    Smoking status: Former     Current packs/day: 0.00     Average packs/day: 1 pack/day for 8.0 years (8.0 ttl pk-yrs)     Types: Cigarettes     Quit date: 2007     Years since quittin.0     Passive exposure: Never    Smokeless tobacco: Never   Vaping Use    Vaping status: Never Used   Substance and Sexual Activity    Alcohol use: Yes     Alcohol/week: 10.0 standard drinks of alcohol     Types: 2 Glasses of wine, 6 Cans of beer, 2 Standard drinks or equivalent per week     Comment: occasional    Drug use: Never   Other Topics Concern    Caffeine Concern No    Exercise No    Seat Belt Yes    Special Diet No    Stress Concern No    Weight Concern Yes   [6]   Current Outpatient Medications   Medication Sig Dispense  Refill    rosuvastatin (CRESTOR) 10 MG Oral Tab Take 1 tablet (10 mg total) by mouth daily. 90 tablet 3    Omeprazole 40 MG Oral Capsule Delayed Release Take 1 capsule (40 mg total) by mouth daily. 90 capsule 3    Sildenafil Citrate (VIAGRA) 100 MG Oral Tab Take one tablet by mouth half hour prior to intercourse (max dose 1 tablet /24 hours) 24 tablet 3    cetirizine (ZYRTEC ALLERGY) 10 MG Oral Tab Take 1 tablet (10 mg total) by mouth daily.     [7] No Known Allergies

## 2025-06-26 ENCOUNTER — OFFICE VISIT (OUTPATIENT)
Dept: INTERNAL MEDICINE CLINIC | Facility: CLINIC | Age: 50
End: 2025-06-26
Payer: COMMERCIAL

## 2025-06-26 VITALS
OXYGEN SATURATION: 97 % | TEMPERATURE: 97 F | WEIGHT: 222 LBS | SYSTOLIC BLOOD PRESSURE: 118 MMHG | BODY MASS INDEX: 29.42 KG/M2 | RESPIRATION RATE: 16 BRPM | DIASTOLIC BLOOD PRESSURE: 84 MMHG | HEART RATE: 92 BPM | HEIGHT: 73 IN

## 2025-06-26 DIAGNOSIS — Z00.00 ANNUAL PHYSICAL EXAM: Primary | ICD-10-CM

## 2025-06-26 DIAGNOSIS — E78.00 HYPERCHOLESTEROLEMIA WITH LDL GREATER THAN 190 MG/DL: ICD-10-CM

## 2025-06-26 PROBLEM — Z91.89 10 YEAR RISK OF MI OR STROKE < 7.5%: Status: RESOLVED | Noted: 2021-09-17 | Resolved: 2025-06-26

## 2025-06-26 PROBLEM — Z83.719 FAMILY HISTORY OF COLON POLYPS, UNSPECIFIED: Status: RESOLVED | Noted: 2024-06-28 | Resolved: 2025-06-26

## 2025-06-26 PROBLEM — E78.2 MODERATE MIXED HYPERLIPIDEMIA NOT REQUIRING STATIN THERAPY: Status: RESOLVED | Noted: 2021-09-17 | Resolved: 2025-06-26

## 2025-06-26 RX ORDER — ROSUVASTATIN CALCIUM 10 MG/1
10 TABLET, COATED ORAL DAILY
Qty: 90 TABLET | Refills: 3 | Status: SHIPPED | OUTPATIENT
Start: 2025-06-26